# Patient Record
Sex: MALE | Race: WHITE | URBAN - METROPOLITAN AREA
[De-identification: names, ages, dates, MRNs, and addresses within clinical notes are randomized per-mention and may not be internally consistent; named-entity substitution may affect disease eponyms.]

---

## 2023-03-02 ENCOUNTER — OFFICE VISIT (OUTPATIENT)
Age: 83
End: 2023-03-02

## 2023-03-02 VITALS
HEIGHT: 69 IN | SYSTOLIC BLOOD PRESSURE: 160 MMHG | TEMPERATURE: 97.1 F | BODY MASS INDEX: 27.7 KG/M2 | DIASTOLIC BLOOD PRESSURE: 65 MMHG | WEIGHT: 187 LBS

## 2023-03-02 DIAGNOSIS — G56.01 RIGHT CARPAL TUNNEL SYNDROME: Primary | ICD-10-CM

## 2023-03-02 DIAGNOSIS — G56.02 LEFT CARPAL TUNNEL SYNDROME: ICD-10-CM

## 2023-03-02 RX ORDER — CARBAMAZEPINE 200 MG/1
TABLET ORAL
COMMUNITY
Start: 2023-02-19

## 2023-03-02 RX ORDER — CLOPIDOGREL BISULFATE 75 MG/1
TABLET ORAL
COMMUNITY
Start: 2023-01-31

## 2023-03-02 RX ORDER — SERTRALINE HYDROCHLORIDE 100 MG/1
TABLET, FILM COATED ORAL
COMMUNITY
Start: 2023-01-17

## 2023-03-02 RX ORDER — ACETAMINOPHEN 500 MG
650 TABLET ORAL EVERY 4 HOURS PRN
COMMUNITY

## 2023-03-02 RX ORDER — MONTELUKAST SODIUM 10 MG/1
TABLET ORAL
COMMUNITY
Start: 2018-07-22

## 2023-03-02 RX ORDER — BUPROPION HYDROCHLORIDE 75 MG/1
75 TABLET ORAL DAILY
COMMUNITY
Start: 2014-10-14

## 2023-03-02 RX ORDER — DIPHENHYDRAMINE HCL 25 MG
25 CAPSULE ORAL EVERY 6 HOURS PRN
COMMUNITY

## 2023-03-02 RX ORDER — GUAIFENESIN AND DEXTROMETHORPHAN HYDROBROMIDE 1200; 60 MG/1; MG/1
1 TABLET, EXTENDED RELEASE ORAL
COMMUNITY

## 2023-03-02 RX ORDER — ATORVASTATIN CALCIUM 80 MG/1
TABLET, FILM COATED ORAL
COMMUNITY
Start: 2023-01-01

## 2023-03-02 RX ORDER — TAMSULOSIN HYDROCHLORIDE 0.4 MG/1
CAPSULE ORAL
COMMUNITY
Start: 2023-02-05

## 2023-03-02 RX ORDER — MECLIZINE HYDROCHLORIDE 25 MG/1
25 TABLET ORAL EVERY 8 HOURS PRN
COMMUNITY

## 2023-03-02 RX ORDER — METOPROLOL SUCCINATE 25 MG/1
TABLET, EXTENDED RELEASE ORAL
COMMUNITY
Start: 2023-02-28

## 2023-03-02 RX ORDER — UBIDECARENONE 100 MG
100 CAPSULE ORAL 2 TIMES DAILY
COMMUNITY
Start: 2011-02-21

## 2023-03-02 RX ORDER — OMEGA-3-ACID ETHYL ESTERS 1 G/1
CAPSULE, LIQUID FILLED ORAL
COMMUNITY
Start: 2022-12-11

## 2023-03-02 RX ORDER — FLUTICASONE PROPIONATE 50 MCG
1 SPRAY, SUSPENSION (ML) NASAL DAILY
COMMUNITY

## 2023-03-02 RX ORDER — FENOFIBRIC ACID 135 MG/1
CAPSULE, DELAYED RELEASE ORAL
COMMUNITY
Start: 2023-02-21

## 2023-03-03 ENCOUNTER — CLINICAL DOCUMENTATION (OUTPATIENT)
Age: 83
End: 2023-03-03

## 2023-03-03 NOTE — PROGRESS NOTES
Patient scheduled RT CTR surgery for 3/27 but needed cardiac clearance.  Patient's cardiologist contacted the patient to state that he would need to be seen prior to giving clearance.  Their first available appt was 4/13.  Patient was rescheduled for an H&P appt on 4/17 and surgery on 4/24.

## 2023-04-17 ENCOUNTER — TELEPHONE (OUTPATIENT)
Age: 83
End: 2023-04-17

## 2023-04-17 NOTE — TELEPHONE ENCOUNTER
Patient's wife, Isabel Dow, called to cancel today's H&P for surgery on 4/24. Patient is not feeling well.      Please advise: [phone]

## 2023-04-20 ENCOUNTER — CLINICAL DOCUMENTATION (OUTPATIENT)
Age: 83
End: 2023-04-20

## 2023-04-20 NOTE — PROGRESS NOTES
Patient's wife called regarding H&P today. States patient is not feeling well and went to PCP who recommended postponing surgery from 4/24. Offered to keep patient on the schedule for originally scheduled PO visit, but wife stated they would call back for another date when patient was feeling better. H&P and post op appts cancelled and patient removed from surgery calendar.

## 2023-06-26 ENCOUNTER — OFFICE VISIT (OUTPATIENT)
Age: 83
End: 2023-06-26
Payer: MEDICARE

## 2023-06-26 VITALS
WEIGHT: 179.4 LBS | SYSTOLIC BLOOD PRESSURE: 169 MMHG | BODY MASS INDEX: 26.57 KG/M2 | HEIGHT: 69 IN | DIASTOLIC BLOOD PRESSURE: 84 MMHG

## 2023-06-26 DIAGNOSIS — G56.01 RIGHT CARPAL TUNNEL SYNDROME: ICD-10-CM

## 2023-06-26 DIAGNOSIS — G56.02 LEFT CARPAL TUNNEL SYNDROME: ICD-10-CM

## 2023-06-26 DIAGNOSIS — G56.01 RIGHT CARPAL TUNNEL SYNDROME: Primary | ICD-10-CM

## 2023-06-26 DIAGNOSIS — Z01.818 PREOP EXAMINATION: Primary | ICD-10-CM

## 2023-06-26 PROCEDURE — 1123F ACP DISCUSS/DSCN MKR DOCD: CPT | Performed by: ORTHOPAEDIC SURGERY

## 2023-06-26 PROCEDURE — 99214 OFFICE O/P EST MOD 30 MIN: CPT | Performed by: ORTHOPAEDIC SURGERY

## 2023-06-26 PROCEDURE — 20526 THER INJECTION CARP TUNNEL: CPT | Performed by: ORTHOPAEDIC SURGERY

## 2023-07-07 ENCOUNTER — HOSPITAL ENCOUNTER (OUTPATIENT)
Facility: HOSPITAL | Age: 83
End: 2023-07-07
Payer: MEDICARE

## 2023-07-07 DIAGNOSIS — Z01.818 PREOP EXAMINATION: ICD-10-CM

## 2023-07-07 DIAGNOSIS — G56.01 RIGHT CARPAL TUNNEL SYNDROME: ICD-10-CM

## 2023-07-07 LAB
ALBUMIN SERPL-MCNC: 3.9 G/DL (ref 3.4–5)
ALBUMIN/GLOB SERPL: 1.3 (ref 0.8–1.7)
ALP SERPL-CCNC: 37 U/L (ref 45–117)
ALT SERPL-CCNC: 35 U/L (ref 16–61)
ANION GAP SERPL CALC-SCNC: 8 MMOL/L (ref 3–18)
AST SERPL-CCNC: 26 U/L (ref 10–38)
BASOPHILS # BLD: 0.1 K/UL (ref 0–0.1)
BASOPHILS NFR BLD: 1 % (ref 0–2)
BILIRUB SERPL-MCNC: 1.2 MG/DL (ref 0.2–1)
BUN SERPL-MCNC: 17 MG/DL (ref 7–18)
BUN/CREAT SERPL: 15 (ref 12–20)
CALCIUM SERPL-MCNC: 8.6 MG/DL (ref 8.5–10.1)
CHLORIDE SERPL-SCNC: 101 MMOL/L (ref 100–111)
CO2 SERPL-SCNC: 24 MMOL/L (ref 21–32)
CREAT SERPL-MCNC: 1.1 MG/DL (ref 0.6–1.3)
DIFFERENTIAL METHOD BLD: ABNORMAL
EOSINOPHIL # BLD: 0.3 K/UL (ref 0–0.4)
EOSINOPHIL NFR BLD: 5 % (ref 0–5)
ERYTHROCYTE [DISTWIDTH] IN BLOOD BY AUTOMATED COUNT: 14.2 % (ref 11.6–14.5)
GLOBULIN SER CALC-MCNC: 2.9 G/DL (ref 2–4)
GLUCOSE SERPL-MCNC: 95 MG/DL (ref 74–99)
HCT VFR BLD AUTO: 38.6 % (ref 36–48)
HGB BLD-MCNC: 13.2 G/DL (ref 13–16)
IMM GRANULOCYTES # BLD AUTO: 0 K/UL (ref 0–0.04)
IMM GRANULOCYTES NFR BLD AUTO: 1 % (ref 0–0.5)
LYMPHOCYTES # BLD: 1.6 K/UL (ref 0.9–3.6)
LYMPHOCYTES NFR BLD: 25 % (ref 21–52)
MCH RBC QN AUTO: 31.5 PG (ref 24–34)
MCHC RBC AUTO-ENTMCNC: 34.2 G/DL (ref 31–37)
MCV RBC AUTO: 92.1 FL (ref 78–100)
MONOCYTES # BLD: 0.7 K/UL (ref 0.05–1.2)
MONOCYTES NFR BLD: 11 % (ref 3–10)
NEUTS SEG # BLD: 3.6 K/UL (ref 1.8–8)
NEUTS SEG NFR BLD: 57 % (ref 40–73)
NRBC # BLD: 0 K/UL (ref 0–0.01)
NRBC BLD-RTO: 0 PER 100 WBC
PLATELET # BLD AUTO: 247 K/UL (ref 135–420)
PMV BLD AUTO: 10.1 FL (ref 9.2–11.8)
POTASSIUM SERPL-SCNC: 4.4 MMOL/L (ref 3.5–5.5)
PROT SERPL-MCNC: 6.8 G/DL (ref 6.4–8.2)
RBC # BLD AUTO: 4.19 M/UL (ref 4.35–5.65)
SODIUM SERPL-SCNC: 133 MMOL/L (ref 136–145)
WBC # BLD AUTO: 6.2 K/UL (ref 4.6–13.2)

## 2023-07-07 PROCEDURE — 85025 COMPLETE CBC W/AUTO DIFF WBC: CPT

## 2023-07-07 PROCEDURE — 36415 COLL VENOUS BLD VENIPUNCTURE: CPT

## 2023-07-07 PROCEDURE — 80053 COMPREHEN METABOLIC PANEL: CPT

## 2023-07-17 DIAGNOSIS — Z98.890 S/P CARPAL TUNNEL RELEASE: ICD-10-CM

## 2023-07-17 DIAGNOSIS — G56.01 RIGHT CARPAL TUNNEL SYNDROME: Primary | ICD-10-CM

## 2023-07-17 RX ORDER — HYDROCODONE BITARTRATE AND ACETAMINOPHEN 5; 325 MG/1; MG/1
1 TABLET ORAL EVERY 6 HOURS PRN
Qty: 12 TABLET | Refills: 0 | Status: SHIPPED | OUTPATIENT
Start: 2023-07-17 | End: 2023-07-20

## 2023-07-27 ENCOUNTER — OFFICE VISIT (OUTPATIENT)
Age: 83
End: 2023-07-27

## 2023-07-27 DIAGNOSIS — M25.551 RIGHT HIP PAIN: ICD-10-CM

## 2023-07-27 DIAGNOSIS — Z98.890 S/P CARPAL TUNNEL RELEASE: ICD-10-CM

## 2023-07-27 DIAGNOSIS — G56.01 RIGHT CARPAL TUNNEL SYNDROME: Primary | ICD-10-CM

## 2023-07-27 PROCEDURE — 99024 POSTOP FOLLOW-UP VISIT: CPT | Performed by: ORTHOPAEDIC SURGERY

## 2023-07-27 NOTE — PROGRESS NOTES
Norm Salcedo. is a 82 y.o. male right handed retiree. Worker's Compensation and legal considerations: none    Chief Complaint   Patient presents with    Post-Op Check     Right CTR 7/17/23     Pain Score:   0 - No pain    HPI: Patient presents today 2-week status post right carpal tunnel release. He also reports worsening of his right hip pain and says he needs to see somebody about this. He reports numbness to be persistent in the right hand though minimal pain today. 6/26/2023 HPI: Patient presents today wanting to set up surgery for his right carpal tunnel syndrome. He is also here requesting a left carpal tunnel injection today. He denies any changes since his last visit. Initial HPI: Patient presents today with complaints of bilateral hand numbness and tingling with a recent positive EMG for carpal tunnel syndrome. Patient has been wearing night splints and would like to try nonoperative treatment first.    Date of onset: Indeterminate  Injury: No  Prior Treatment:  Yes: Comment: Right carpal tunnel release    ROS: Review of Systems - General ROS: negative except HPI    Past Medical History:   Diagnosis Date    Abnormal nuclear cardiac imaging test 10/03/2012    Mild-mod inferolateral ischemia c/w decreased perfusion to RPLB or OMB of circumflex. Mod anteroseptal hypk. EF 60%. Strongly positive stress test by EKG and symptoms. Intermediate risk. Ex time 5 min 44 sec.     CAD (coronary artery disease)     Coronary artery disease     Crescendo angina (HCC)     in the setting of a strongly positive myocardial perfusion imaging and significant obstructive disease on cardiac catheterization on 12/31/08, s/p CABG X2    History of carotid endarterectomy 3/09    right    Hypercholesterolemia     PVD (peripheral vascular disease) ([address])     with a history of high-grade right internal carotid artery stenosis s/p carotid endarterectomy by Dr. Kamran Rothman    S/P cardiac catheterization 10/03/2012    mRCA

## 2023-12-28 ENCOUNTER — OFFICE VISIT (OUTPATIENT)
Age: 83
End: 2023-12-28

## 2023-12-28 VITALS — HEIGHT: 69 IN | BODY MASS INDEX: 27.4 KG/M2 | TEMPERATURE: 97 F | WEIGHT: 185 LBS

## 2023-12-28 DIAGNOSIS — G56.01 RIGHT CARPAL TUNNEL SYNDROME: ICD-10-CM

## 2023-12-28 DIAGNOSIS — G56.02 LEFT CARPAL TUNNEL SYNDROME: Primary | ICD-10-CM

## 2023-12-28 DIAGNOSIS — Z98.890 S/P CARPAL TUNNEL RELEASE: ICD-10-CM

## 2023-12-28 NOTE — PROGRESS NOTES
Beny Ruelas is a 82 y.o. male right handed retiree. Worker's Compensation and legal considerations: none    Chief Complaint   Patient presents with    Wrist Pain     Bilateral wrist pain     Pain Score:   0 - No pain    HPI: Patient presents today for follow-up of left carpal tunnel syndrome. He would like a repeat injection as it has helped previously. He is also approximately 5 months status post right carpal tunnel release with continued numbness without improvement. 7/27/2023 HPI: Patient presents today 2-week status post right carpal tunnel release. He also reports worsening of his right hip pain and says he needs to see somebody about this. He reports numbness to be persistent in the right hand though minimal pain today. Initial HPI: Patient presents today with complaints of bilateral hand numbness and tingling with a recent positive EMG for carpal tunnel syndrome. Patient has been wearing night splints and would like to try nonoperative treatment first.    Date of onset: Indeterminate  Injury: No  Prior Treatment:  Yes: Comment: Left carpal tunnel injection. Right carpal tunnel release    ROS: Review of Systems - General ROS: negative except HPI    Past Medical History:   Diagnosis Date    Abnormal nuclear cardiac imaging test 10/03/2012    Mild-mod inferolateral ischemia c/w decreased perfusion to RPLB or OMB of circumflex. Mod anteroseptal hypk. EF 60%. Strongly positive stress test by EKG and symptoms. Intermediate risk. Ex time 5 min 44 sec.     CAD (coronary artery disease)     Coronary artery disease     Crescendo angina (HCC)     in the setting of a strongly positive myocardial perfusion imaging and significant obstructive disease on cardiac catheterization on 12/31/08, s/p CABG X2    History of carotid endarterectomy 3/09    right    Hypercholesterolemia     PVD (peripheral vascular disease) ([address])     with a history of high-grade right internal carotid artery stenosis s/p

## 2024-03-28 ENCOUNTER — OFFICE VISIT (OUTPATIENT)
Age: 84
End: 2024-03-28
Payer: MEDICARE

## 2024-03-28 VITALS — WEIGHT: 188 LBS | BODY MASS INDEX: 27.85 KG/M2 | HEIGHT: 69 IN

## 2024-03-28 DIAGNOSIS — G56.02 SEVERE CARPAL TUNNEL SYNDROME, LEFT: Primary | ICD-10-CM

## 2024-03-28 PROCEDURE — 99214 OFFICE O/P EST MOD 30 MIN: CPT | Performed by: ORTHOPAEDIC SURGERY

## 2024-03-28 PROCEDURE — 1123F ACP DISCUSS/DSCN MKR DOCD: CPT | Performed by: ORTHOPAEDIC SURGERY

## 2024-07-10 ENCOUNTER — HOSPITAL ENCOUNTER (OUTPATIENT)
Facility: HOSPITAL | Age: 84
Discharge: HOME | End: 2024-07-12
Payer: MEDICARE

## 2024-07-10 DIAGNOSIS — I67.2 CEREBRAL ATHEROSCLEROSIS: ICD-10-CM

## 2024-07-10 LAB
VAS LEFT CCA DIST EDV: 15 CM/S
VAS LEFT CCA DIST PSV: 69.4 CM/S
VAS LEFT CCA MID EDV: 9.84 CM/S
VAS LEFT CCA MID PSV: 83.6 CM/S
VAS LEFT CCA PROX EDV: 9.1 CM/S
VAS LEFT CCA PROX PSV: 73.7 CM/S
VAS LEFT ECA EDV: 8.54 CM/S
VAS LEFT ECA PSV: 87.5 CM/S
VAS LEFT ICA DIST EDV: 13.9 CM/S
VAS LEFT ICA DIST PSV: 71 CM/S
VAS LEFT ICA MID EDV: 6.8 CM/S
VAS LEFT ICA MID PSV: 61.6 CM/S
VAS LEFT ICA PROX EDV: 24.6 CM/S
VAS LEFT ICA PROX PSV: 173.3 CM/S
VAS LEFT ICA/CCA PSV: 2.5 NO UNITS
VAS LEFT SUBCLAVIAN MID EDV: 0 CM/S
VAS LEFT SUBCLAVIAN MID PSV: 141.1 CM/S
VAS LEFT SUBCLAVIAN PROX EDV: 0 CM/S
VAS LEFT SUBCLAVIAN PROX PSV: 168.7 CM/S
VAS LEFT VERTEBRAL EDV: 10.99 CM/S
VAS LEFT VERTEBRAL PSV: 45.9 CM/S
VAS RIGHT CCA DIST EDV: 11.5 CM/S
VAS RIGHT CCA DIST PSV: 62 CM/S
VAS RIGHT CCA MID EDV: 12.81 CM/S
VAS RIGHT CCA MID PSV: 69.76 CM/S
VAS RIGHT CCA PROX EDV: 10.2 CM/S
VAS RIGHT CCA PROX PSV: 62 CM/S
VAS RIGHT ECA EDV: 6.3 CM/S
VAS RIGHT ECA PSV: 77.4 CM/S
VAS RIGHT ICA DIST EDV: 13.4 CM/S
VAS RIGHT ICA DIST PSV: 62.5 CM/S
VAS RIGHT ICA MID EDV: 14.1 CM/S
VAS RIGHT ICA MID PSV: 73.3 CM/S
VAS RIGHT ICA PROX EDV: 10.6 CM/S
VAS RIGHT ICA PROX PSV: 50.7 CM/S
VAS RIGHT ICA/CCA PSV: 1.2 NO UNITS
VAS RIGHT SUBCLAVIAN PROX EDV: 0 CM/S
VAS RIGHT SUBCLAVIAN PROX PSV: 128.6 CM/S
VAS RIGHT VERTEBRAL EDV: 6.33 CM/S
VAS RIGHT VERTEBRAL PSV: 24.8 CM/S

## 2024-07-10 PROCEDURE — 93880 EXTRACRANIAL BILAT STUDY: CPT

## 2024-07-10 PROCEDURE — 93880 EXTRACRANIAL BILAT STUDY: CPT | Performed by: INTERNAL MEDICINE

## 2024-12-17 RX ORDER — FLUTICASONE PROPIONATE AND SALMETEROL XINAFOATE 45; 21 UG/1; UG/1
2 AEROSOL, METERED RESPIRATORY (INHALATION) 2 TIMES DAILY
COMMUNITY

## 2024-12-17 RX ORDER — WARFARIN SODIUM 5 MG/1
5 TABLET ORAL SEE ADMIN INSTRUCTIONS
COMMUNITY
Start: 2024-06-20

## 2024-12-20 ENCOUNTER — ANESTHESIA EVENT (OUTPATIENT)
Facility: HOSPITAL | Age: 84
End: 2024-12-20
Payer: MEDICARE

## 2024-12-23 ENCOUNTER — ANESTHESIA (OUTPATIENT)
Facility: HOSPITAL | Age: 84
End: 2024-12-23
Payer: MEDICARE

## 2024-12-23 ENCOUNTER — HOSPITAL ENCOUNTER (OUTPATIENT)
Facility: HOSPITAL | Age: 84
Setting detail: OUTPATIENT SURGERY
Discharge: HOME | End: 2024-12-23
Attending: INTERNAL MEDICINE | Admitting: INTERNAL MEDICINE
Payer: MEDICARE

## 2024-12-23 VITALS
OXYGEN SATURATION: 96 % | SYSTOLIC BLOOD PRESSURE: 125 MMHG | BODY MASS INDEX: 27.62 KG/M2 | WEIGHT: 186.5 LBS | HEIGHT: 69 IN | RESPIRATION RATE: 18 BRPM | HEART RATE: 47 BPM | TEMPERATURE: 97.9 F | DIASTOLIC BLOOD PRESSURE: 62 MMHG

## 2024-12-23 PROCEDURE — 2580000003 HC RX 258: Performed by: NURSE ANESTHETIST, CERTIFIED REGISTERED

## 2024-12-23 PROCEDURE — 3600007502: Performed by: INTERNAL MEDICINE

## 2024-12-23 PROCEDURE — 2500000003 HC RX 250 WO HCPCS: Performed by: NURSE ANESTHETIST, CERTIFIED REGISTERED

## 2024-12-23 PROCEDURE — 6360000002 HC RX W HCPCS: Performed by: NURSE ANESTHETIST, CERTIFIED REGISTERED

## 2024-12-23 PROCEDURE — 3600007512: Performed by: INTERNAL MEDICINE

## 2024-12-23 PROCEDURE — 2709999900 HC NON-CHARGEABLE SUPPLY: Performed by: INTERNAL MEDICINE

## 2024-12-23 PROCEDURE — 3700000001 HC ADD 15 MINUTES (ANESTHESIA): Performed by: INTERNAL MEDICINE

## 2024-12-23 PROCEDURE — 7100000010 HC PHASE II RECOVERY - FIRST 15 MIN: Performed by: INTERNAL MEDICINE

## 2024-12-23 PROCEDURE — 7100000000 HC PACU RECOVERY - FIRST 15 MIN: Performed by: INTERNAL MEDICINE

## 2024-12-23 PROCEDURE — 3700000000 HC ANESTHESIA ATTENDED CARE: Performed by: INTERNAL MEDICINE

## 2024-12-23 RX ORDER — SODIUM CHLORIDE 9 MG/ML
INJECTION, SOLUTION INTRAVENOUS
Status: DISCONTINUED | OUTPATIENT
Start: 2024-12-23 | End: 2024-12-23 | Stop reason: SDUPTHER

## 2024-12-23 RX ORDER — SODIUM CHLORIDE, SODIUM LACTATE, POTASSIUM CHLORIDE, CALCIUM CHLORIDE 600; 310; 30; 20 MG/100ML; MG/100ML; MG/100ML; MG/100ML
INJECTION, SOLUTION INTRAVENOUS CONTINUOUS
Status: DISCONTINUED | OUTPATIENT
Start: 2024-12-23 | End: 2024-12-23 | Stop reason: HOSPADM

## 2024-12-23 RX ORDER — LIDOCAINE HYDROCHLORIDE 10 MG/ML
1 INJECTION, SOLUTION EPIDURAL; INFILTRATION; INTRACAUDAL; PERINEURAL
Status: DISCONTINUED | OUTPATIENT
Start: 2024-12-23 | End: 2024-12-23 | Stop reason: HOSPADM

## 2024-12-23 RX ORDER — PROPOFOL 10 MG/ML
INJECTION, EMULSION INTRAVENOUS
Status: DISCONTINUED | OUTPATIENT
Start: 2024-12-23 | End: 2024-12-23 | Stop reason: SDUPTHER

## 2024-12-23 RX ORDER — SODIUM CHLORIDE 0.9 % (FLUSH) 0.9 %
5-40 SYRINGE (ML) INJECTION PRN
Status: DISCONTINUED | OUTPATIENT
Start: 2024-12-23 | End: 2024-12-23 | Stop reason: HOSPADM

## 2024-12-23 RX ADMIN — PROPOFOL 50 MG: 10 INJECTION, EMULSION INTRAVENOUS at 14:14

## 2024-12-23 RX ADMIN — DEXMEDETOMIDINE 6 MCG: 200 INJECTION, SOLUTION INTRAVENOUS at 13:59

## 2024-12-23 RX ADMIN — PROPOFOL 20 MG: 10 INJECTION, EMULSION INTRAVENOUS at 14:15

## 2024-12-23 RX ADMIN — DEXMEDETOMIDINE 4 MCG: 200 INJECTION, SOLUTION INTRAVENOUS at 14:05

## 2024-12-23 RX ADMIN — SODIUM CHLORIDE: 9 INJECTION, SOLUTION INTRAVENOUS at 13:52

## 2024-12-23 RX ADMIN — PROPOFOL 20 MG: 10 INJECTION, EMULSION INTRAVENOUS at 14:16

## 2024-12-23 ASSESSMENT — PAIN - FUNCTIONAL ASSESSMENT: PAIN_FUNCTIONAL_ASSESSMENT: 0-10

## 2024-12-23 NOTE — DISCHARGE INSTRUCTIONS
process. If you do not sign up before the expiration date, you must request a new code.    [employer] Access Code: Activation code not generated  Current [employer] Status: Active (This is the date your [employer] access code will )    Enter the last four digits of your Social Security Number (xxxx) and Date of Birth (mm/dd/yyyy) as indicated and click Submit. You will be taken to the next sign-up page.  Create a [employer] ID. This will be your [employer] login ID and cannot be changed, so think of one that is secure and easy to remember.  Create a [employer] password. You can change your password at any time.  Enter your Password Reset Question and Answer. This can be used at a later time if you forget your password.   Enter your e-mail address. You will receive e-mail notification when new information is available in [employer].  Click Sign Up. You can now view and download portions of your medical record.  Click the Download Summary menu link to download a portable copy of your medical information.    Additional Information    If you have questions, please call [phone]. Remember, [employer] is NOT to be used for urgent needs. For medical emergencies, dial 911.    Educational references and/or instructions provided during this visit included:    See Attached      APPOINTMENTS:    Per MD Instruction    Discharge information has been reviewed with the patient.  The patient verbalized understanding.

## 2024-12-23 NOTE — ANESTHESIA POSTPROCEDURE EVALUATION
Department of Anesthesiology  Postprocedure Note    Patient: Bradley Azevedo Jr.  MRN: [id number]  YOB: 1940  Date of evaluation: 12/23/2024    Procedure Summary       Date: 12/23/24 Room / Location: [facility] ENDO 02 / [facility] ENDOSCOPY    Anesthesia Start: 1356 Anesthesia Stop: 1428    Procedure: ESOPHAGOGASTRODUODENOSCOPY w/ Bx, 54 Fr DILATATION (Upper GI Region) Diagnosis:       Dysphagia, unspecified type      Esophageal dysmotility      (Dysphagia, unspecified type [R13.10])      (Esophageal dysmotility [K22.4])    Surgeons: Keenan Gonzalez MD Responsible Provider: Naveed Martines MD    Anesthesia Type: MAC ASA Status: 3            Anesthesia Type: MAC    Kj Phase I: Kj Score: 8    Jk Phase II: Kj Score: 10    Anesthesia Post Evaluation    Patient location during evaluation: bedside  Patient participation: complete - patient participated  Level of consciousness: awake  Pain score: 0  Airway patency: patent  Nausea & Vomiting: no nausea  Cardiovascular status: hemodynamically stable  Respiratory status: acceptable  Hydration status: euvolemic  Pain management: satisfactory to patient    No notable events documented.

## 2024-12-23 NOTE — ANESTHESIA PRE PROCEDURE
Department of Anesthesiology  Preprocedure Note       Name:  Bradley Azevedo Jr.   Age:  84 y.o.  :  1940                                          MRN:  [id number]         Date:  2024      Surgeon: Surgeon(s):  Keenan Gonzalez MD    Procedure: Procedure(s):  ESOPHAGOGASTRODUODENOSCOPY DILATATION    Medications prior to admission:   Prior to Admission medications    Medication Sig Start Date End Date Taking? Authorizing Provider   warfarin (COUMADIN) 5 MG tablet Take 1 tablet by mouth See Admin Instructions 24  Yes Phil Cotton MD   fluticasone-salmeterol (ADVAIR HFA) 45-21 MCG/ACT inhaler Inhale 2 puffs into the lungs 2 times daily   Yes Phil Cotton MD   atorvastatin (LIPITOR) 80 MG tablet Take 1 tablet by mouth daily   Yes Automatic Reconciliation, Ar   carBAMazepine (TEGRETOL XR) 100 MG extended release tablet Take by mouth 2 times daily Takes 100mg am, and 150mg pm   Yes Automatic Reconciliation, Ar   choline fenofibrate (TRILIPIX) 135 MG CPDR delayed release capsule Take 1 capsule by mouth daily   Yes Automatic Reconciliation, Ar   metoprolol succinate (TOPROL XL) 25 MG extended release tablet TAKE ONE TABLET BY MOUTH EVERY DAY 9/3/20  Yes Automatic Reconciliation, Ar   sertraline (ZOLOFT) 100 MG tablet Take 1 tablet by mouth daily   Yes Automatic Reconciliation, Ar   ASPIRIN 81 PO Take 81 mg by mouth daily   Yes ProviderPhil MD   acetaminophen (TYLENOL) 500 MG tablet Take 650 mg by mouth every 4 hours as needed   Yes ProviderPhil MD   buPROPion (WELLBUTRIN) 75 MG tablet Take 1 tablet by mouth daily 10/14/14  Yes Phil Cotton MD   coenzyme Q10 100 MG CAPS capsule Take 1 capsule by mouth 2 times daily 11  Yes Phil Cotton MD   diphenhydrAMINE (BENADRYL) 25 MG capsule Take 1 capsule by mouth every 6 hours as needed   Yes Phil Cotton MD   fluticasone (FLONASE) 50 MCG/ACT nasal spray 1 spray by Nasal route daily as needed

## 2024-12-23 NOTE — H&P
Chief Complaint: dysphagia           History of Present Illness:   This patient is an 83-year-old gentleman seen for the above. He has had problems with dysphagia for more than a year. His symptoms have been worse in recent months. He points to the mid sternum where the solid food bolus will obstruct. He especially has problems with pill dysphagia. He denies any reflux symptoms and has had no weight loss anorexia or melena. Recent barium swallow revealed abnormal esophageal motility with reverse peristalsis and incomplete clearing. A barium pill passed without difficulty.      Past Medical History      Medical Conditions: Acid Reflux  Arthritis  Back Pain (chronic )  Blood Clots (leg)  Blood Clots (lung)  Carotid artery disease  Deafness  Elevated cholesterol  Enlarged prostate  Seizures - last 1993   Surgical Procedures: Colon Polyp removed, 6-08  Double bypass, 1-06-09  carotid artery procedure   Dx Studies: Colonoscopy, 6-08  Colonoscopy, 11/16/2022, Polyp (5 mm) in the proximal descending colon. (Polypectomy, Injection) and Polyp (3 mm) in the distal rectum. (Polypectomy)  Colonoscopy, 3/11/2019, Polyps (3 mm to 5 mm) in the ascending colon and descending colon. (Polypectomy, Injection) path: TA  Colonoscopy, 11/20/2013, Polyps (5 mm to 7 mm) in the ascending colon, transverse colon and descending colon. (Polypectomy) and Polyp (4 mm) in the ascending colon. (Biopsy); Path: TA, 5 year recall  Radiology: Barium Swallow, 5/6/2024  Anthony Doe- Blood Clot Lung & Leg-Admitted 9 days- 6/2024   Medications: acetaminophen 650 mg Take up to 4 tablet by mouth daily as needed  Adult Low Dose Aspirin 81 mg Take 1 tablet by mouth once a day  atorvastatin 80 mg Take 1 tablet by mouth once a day  carbamazepine 200 mg Take 2 1/2 tablet by mouth once a day  coQ10 (ubiquinol) 100 mg Take 2 capsule by mouth daily  Culturelle 15 billion cell Take 1 capsule by mouth once a day  fluticasone 50 mcg/actuation Inhale 1 into both

## (undated) DEVICE — STERILE POLYISOPRENE POWDER-FREE SURGICAL GLOVES: Brand: PROTEXIS

## (undated) DEVICE — YANKAUER,SMOOTH HANDLE,HIGH CAPACITY: Brand: MEDLINE INDUSTRIES, INC.

## (undated) DEVICE — MEDI-VAC NON-CONDUCTIVE SUCTION TUBING: Brand: CARDINAL HEALTH

## (undated) DEVICE — CANNULA NSL AD TBNG L14FT STD PVC O2 CRV CONN NONFLARED NSL

## (undated) DEVICE — SYRINGE MED 50ML LUERSLIP TIP

## (undated) DEVICE — SYRINGE MED 25GA 3ML L5/8IN SUBQ PLAS W/ DETACH NDL SFTY

## (undated) DEVICE — GAUZE,SPONGE,4"X4",16PLY,STRL,LF,10/TRAY: Brand: MEDLINE

## (undated) DEVICE — SOLUTION IRRIG 1000ML H2O STRL BLT

## (undated) DEVICE — UNDERPAD INCONT W23XL36IN STD BLU POLYPR BK FLUF SFT

## (undated) DEVICE — BITE BLOCK ENDOSCP UNIV AD 6 TO 9.4 MM

## (undated) DEVICE — ENDOSCOPY PUMP TUBING/ CAP SET: Brand: ERBE

## (undated) DEVICE — FORCEPS BX L240CM JAW DIA2.4MM ORNG L CAP W/ NDL DISP RAD

## (undated) DEVICE — LINER SUCT CANSTR 3000CC PLAS SFT PRE ASSEMB W/OUT TBNG W/

## (undated) DEVICE — CATHETER SUCT TR FL TIP 14FR W/ O CTRL

## (undated) DEVICE — BASIN EMSIS 16OZ GRAPHITE PLAS KID SHP MOLD GRAD FOR ORAL